# Patient Record
Sex: FEMALE
[De-identification: names, ages, dates, MRNs, and addresses within clinical notes are randomized per-mention and may not be internally consistent; named-entity substitution may affect disease eponyms.]

---

## 2021-11-16 PROBLEM — Z00.00 ENCOUNTER FOR PREVENTIVE HEALTH EXAMINATION: Status: ACTIVE | Noted: 2021-11-16

## 2021-12-01 ENCOUNTER — APPOINTMENT (OUTPATIENT)
Dept: OBGYN | Facility: CLINIC | Age: 36
End: 2021-12-01
Payer: COMMERCIAL

## 2021-12-01 VITALS
BODY MASS INDEX: 22.58 KG/M2 | SYSTOLIC BLOOD PRESSURE: 92 MMHG | WEIGHT: 115 LBS | HEIGHT: 60 IN | DIASTOLIC BLOOD PRESSURE: 64 MMHG

## 2021-12-01 DIAGNOSIS — N93.9 ABNORMAL UTERINE AND VAGINAL BLEEDING, UNSPECIFIED: ICD-10-CM

## 2021-12-01 DIAGNOSIS — D25.2 SUBSEROSAL LEIOMYOMA OF UTERUS: ICD-10-CM

## 2021-12-01 PROCEDURE — 99203 OFFICE O/P NEW LOW 30 MIN: CPT

## 2021-12-01 NOTE — PHYSICAL EXAM
[Appropriately responsive] : appropriately responsive [Alert] : alert [No Acute Distress] : no acute distress [Soft] : soft [Non-tender] : non-tender [Non-distended] : non-distended [No Lesions] : no lesions [No Mass] : no mass

## 2021-12-01 NOTE — PLAN
[FreeTextEntry1] : 36 year old female with AUB, here for consultation\par -reviewed AUB, discussed fibroid likely not cause of bleeding, figure drawn for pt\par -reviewed that first line would be OCP, discussed OCP, LARC, depo hormonal options for AUB, pt declining at this time\par -discussed need for sono yearly\par -questions answered\par -no surgical intervention needed\par Mildred Cornell MD \par

## 2021-12-01 NOTE — HISTORY OF PRESENT ILLNESS
[Abnormal Quantity] : abnormal quantity [___ Days] : [unfilled] days [Moderate Bleeding] : moderate bleeding [No] : Patient does not have concerns regarding sex [FreeTextEntry1] : 36 year old G0 female who presents today with a 2 cm SS fibroid seen on an US 3/25/21 which was done for heavy periods. States she had an endometrial polyp in the past.  Not on OCP, tried years ago and states that she was spotting the enitre time, unsure of what medication she was on. Denies any medical hx. She takes antihistamine for allergies. PSHx uterine polypectomy. NKDA. \par States mother had a hx of fibroids and requried a hysterectomy. \par US pelvis 3/2021: (report seen off pt phone/portal): 6-7cm uterus, 1.9cm SS fibroid, normal adenxa  [TextBox_4] : Consult regarding uterine fibroids [PapSmeardate] : 03/2021 [LMPDate] : 11/9/2021